# Patient Record
Sex: MALE | Race: WHITE
[De-identification: names, ages, dates, MRNs, and addresses within clinical notes are randomized per-mention and may not be internally consistent; named-entity substitution may affect disease eponyms.]

---

## 2017-12-17 ENCOUNTER — HOSPITAL ENCOUNTER (EMERGENCY)
Dept: HOSPITAL 80 - FED | Age: 27
Discharge: HOME | End: 2017-12-17
Payer: COMMERCIAL

## 2017-12-17 VITALS
OXYGEN SATURATION: 96 % | DIASTOLIC BLOOD PRESSURE: 76 MMHG | TEMPERATURE: 98.6 F | SYSTOLIC BLOOD PRESSURE: 138 MMHG | HEART RATE: 81 BPM | RESPIRATION RATE: 19 BRPM

## 2017-12-17 DIAGNOSIS — Z23: ICD-10-CM

## 2017-12-17 DIAGNOSIS — R04.0: Primary | ICD-10-CM

## 2017-12-17 NOTE — EDPHY
H & P


Time Seen by Provider: 12/17/17 09:47


HPI/ROS: 





CHIEF COMPLAINT:  Epistaxis





HISTORY OF PRESENT ILLNESS:  27-year-old male presents to the emergency 

department with epistaxis from the right nostril.  The patient has had this 

typically on an annual basis.  He denies any known trauma or injury. He states 

that he was having difficulty stopping the bleeding.  He denies feeling 

lightheaded.  Denies chest pain or difficulty breathing.  He denies bleeding 

from his gums or easy bruising.





ROS:  Denies dysphagia, difficulty breathing, fever


Past Medical/Surgical History: 





Negative


Social History: 





Single


Smoking Status: Never smoked


Physical Exam: 





On examination the patient has no active bleeding noted currently.  His vital 

signs are stable.  He has what appears to be a superficial abrasion to 

Kiesselbach's plexus in the right nostril.  Left nostril is clear.  He has 

small blood in the posterior pharynx.  Ears are clear bilaterally. Neck is 

supple without lymphadenopathy.


Constitutional: 


 Initial Vital Signs











Heart Rate  77   12/17/17 09:41


 


Respiratory Rate  18   12/17/17 09:41


 


Blood Pressure  152/94 H  12/17/17 09:41


 


O2 Sat (%)  98   12/17/17 09:41








 











O2 Delivery Mode               Room Air














Allergies/Adverse Reactions: 


 





amoxicillin Allergy (Mild, Verified 12/17/17 09:41)


 Rash








Home Medications: 














 Medication  Instructions  Recorded


 


NK [No Known Home Meds]  12/17/17














MDM/Departure





- MDM


Procedures: 





Procedure:  Epistaxis control.


After verbal consent was obtained, the anterior epistaxis was identified.  The 

patient was treated with TXA right nostril.  Following the procedure the 

patient was re-examined and the bleeding was well controlled.  The patient 

tolerated the procedure well.  The procedure was performed by myself.


Medications Given: 


 








Discontinued Medications





Diphtheria/Tetanus/Acell Pertussis (Boostrix)  0.5 ml IM .ONCE ONE


   Stop: 12/17/17 10:19


   Last Admin: 12/17/17 10:34 Dose:  0.5 ml


Tranexamic Acid (Cyklokapron)  500 mg TP EDNOW ONE


   Stop: 12/17/17 09:53


   Last Admin: 12/17/17 09:54 Dose:  500 mg





ED Course/Re-evaluation: 





27-year-old male presents to the emergency department with epistaxis.  This is 

well controlled now.  Patient blew his nose and with no clots.  No active 

bleeding noted. TXA soaked gauze was placed in the patient's right nostril, see 

procedure note.





Patient tolerated this quite well.  He had no recurring bleeding.





- Depart


Disposition: Home, Routine, Self-Care


Clinical Impression: 


 Epistaxis





Condition: Good


Instructions:  Nosebleed (ED)


Additional Instructions: 


Add nasal lubricant such as Vaseline daily to your nose as discussed.  

Humidifier can also help specially this time a year.  Return to the emergency 

department if you developed recurring nose bleed or any other concerns.


Referrals: 


Bobby Preston MD [Medical Doctor] - 2-3 days, if not improved (ENT on-call)

## 2018-03-07 ENCOUNTER — HOSPITAL ENCOUNTER (OUTPATIENT)
Dept: HOSPITAL 80 - BMCIMAGING | Age: 28
End: 2018-03-07
Attending: EMERGENCY MEDICINE
Payer: COMMERCIAL

## 2018-03-07 DIAGNOSIS — S93.401A: Primary | ICD-10-CM
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
## 2018-03-07 DIAGNOSIS — R93.6: ICD-10-CM
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
